# Patient Record
Sex: MALE | Race: WHITE
[De-identification: names, ages, dates, MRNs, and addresses within clinical notes are randomized per-mention and may not be internally consistent; named-entity substitution may affect disease eponyms.]

---

## 2018-04-23 ENCOUNTER — HOSPITAL ENCOUNTER (EMERGENCY)
Dept: HOSPITAL 62 - ER | Age: 38
Discharge: HOME | End: 2018-04-23
Payer: COMMERCIAL

## 2018-04-23 VITALS — DIASTOLIC BLOOD PRESSURE: 98 MMHG | SYSTOLIC BLOOD PRESSURE: 180 MMHG

## 2018-04-23 DIAGNOSIS — I11.0: Primary | ICD-10-CM

## 2018-04-23 DIAGNOSIS — R05: ICD-10-CM

## 2018-04-23 DIAGNOSIS — R06.02: ICD-10-CM

## 2018-04-23 DIAGNOSIS — I50.9: ICD-10-CM

## 2018-04-23 LAB
ADD MANUAL DIFF: NO
ANION GAP SERPL CALC-SCNC: 13 MMOL/L (ref 5–19)
BASOPHILS # BLD AUTO: 0 10^3/UL (ref 0–0.2)
BASOPHILS NFR BLD AUTO: 0.8 % (ref 0–2)
BUN SERPL-MCNC: 19 MG/DL (ref 7–20)
CALCIUM: 9.8 MG/DL (ref 8.4–10.2)
CHLORIDE SERPL-SCNC: 101 MMOL/L (ref 98–107)
CO2 SERPL-SCNC: 30 MMOL/L (ref 22–30)
EOSINOPHIL # BLD AUTO: 0.5 10^3/UL (ref 0–0.6)
EOSINOPHIL NFR BLD AUTO: 7.9 % (ref 0–6)
ERYTHROCYTE [DISTWIDTH] IN BLOOD BY AUTOMATED COUNT: 12.7 % (ref 11.5–14)
GLUCOSE SERPL-MCNC: 95 MG/DL (ref 75–110)
HCT VFR BLD CALC: 39.6 % (ref 37.9–51)
HGB BLD-MCNC: 13.6 G/DL (ref 13.5–17)
LYMPHOCYTES # BLD AUTO: 1.9 10^3/UL (ref 0.5–4.7)
LYMPHOCYTES NFR BLD AUTO: 32.8 % (ref 13–45)
MCH RBC QN AUTO: 28.9 PG (ref 27–33.4)
MCHC RBC AUTO-ENTMCNC: 34.3 G/DL (ref 32–36)
MCV RBC AUTO: 84 FL (ref 80–97)
MONOCYTES # BLD AUTO: 0.6 10^3/UL (ref 0.1–1.4)
MONOCYTES NFR BLD AUTO: 10.3 % (ref 3–13)
NEUTROPHILS # BLD AUTO: 2.8 10^3/UL (ref 1.7–8.2)
NEUTS SEG NFR BLD AUTO: 48.2 % (ref 42–78)
PLATELET # BLD: 265 10^3/UL (ref 150–450)
POTASSIUM SERPL-SCNC: 3.2 MMOL/L (ref 3.6–5)
RBC # BLD AUTO: 4.71 10^6/UL (ref 4.35–5.55)
SODIUM SERPL-SCNC: 144.1 MMOL/L (ref 137–145)
TOTAL CELLS COUNTED % (AUTO): 100 %
WBC # BLD AUTO: 5.8 10^3/UL (ref 4–10.5)

## 2018-04-23 PROCEDURE — 36415 COLL VENOUS BLD VENIPUNCTURE: CPT

## 2018-04-23 PROCEDURE — 80048 BASIC METABOLIC PNL TOTAL CA: CPT

## 2018-04-23 PROCEDURE — 99283 EMERGENCY DEPT VISIT LOW MDM: CPT

## 2018-04-23 PROCEDURE — 85025 COMPLETE CBC W/AUTO DIFF WBC: CPT

## 2018-04-23 PROCEDURE — 71046 X-RAY EXAM CHEST 2 VIEWS: CPT

## 2018-04-23 PROCEDURE — 83880 ASSAY OF NATRIURETIC PEPTIDE: CPT

## 2018-04-23 NOTE — RADIOLOGY REPORT (SQ)
EXAM DESCRIPTION:  CHEST 2 VIEWS



COMPLETED DATE/TIME:  4/23/2018 5:38 pm



REASON FOR STUDY:  sob/cough



COMPARISON:  None.



EXAM PARAMETERS:  NUMBER OF VIEWS: two views

TECHNIQUE: Digital Frontal and Lateral radiographic views of the chest acquired.

RADIATION DOSE: NA

LIMITATIONS: none



FINDINGS:  LUNGS AND PLEURA: No opacities, masses or pneumothorax. No pleural effusion.

MEDIASTINUM AND HILAR STRUCTURES: No masses or contour abnormalities.

HEART AND VASCULAR STRUCTURES: Heart normal size.  No evidence for failure.

BONES: No acute findings.

HARDWARE: None in the chest.

OTHER: No other significant finding.



IMPRESSION:  NO ACUTE RADIOGRAPHIC FINDING IN THE CHEST.



TECHNICAL DOCUMENTATION:  JOB ID:  [id number]

 2011 [employer]- All Rights Reserved



Reading location - IP/workstation name: [email or web address]

## 2018-04-23 NOTE — ER DOCUMENT REPORT
ED Blood Pressure Problem





- General


Chief Complaint: High Blood Pressure


Stated Complaint: BLOOD PRESSURE ISSUE


Time Seen by Provider: 04/23/18 17:32


Mode of Arrival: Ambulatory


Information source: Patient


Notes: 





Patient was referred from his family's doctor's office due to an elevated blood 

pressure of 190/120.  He received 0.2 of clonidine however blood pressure was 

not significantly decreased the patient was referred here to the emergency 

department.  Patient denies any headache.  He states over the last couple of 

days she has had some cough and congestion and shortness of breath.  He states 

this was the main reason he went to his family physician.  Symptoms have been 

intermittent.  Nothing makes them better or worse.  There is no known radiation 

symptoms.  They have been mild to moderate.


TRAVEL OUTSIDE OF THE [place] IN LAST 30 DAYS: No





- Related Data


Allergies/Adverse Reactions: 


 





No Known Allergies Allergy (Unverified 04/23/18 16:12)


 











Past Medical History





- General


Information source: Patient





- Social History


Smoking Status: Never Smoker


Frequency of alcohol use: None


Drug Abuse: None


Family History: Reviewed & Not Pertinent


Patient has suicidal ideation: No


Patient has homicidal ideation: No





- Past Medical History


Cardiac Medical History: Reports: Hx Hypertension


Renal/ Medical History: Denies: Hx Peritoneal Dialysis





Review of Systems





- Review of Systems


Constitutional: denies: Chills, Fever


Cardiovascular: denies: Chest pain, Palpitations


Respiratory: Cough, Short of breath


-: Yes All other systems reviewed and negative





Physical Exam





- Vital signs


Vitals: 





 











Temp Pulse Resp BP Pulse Ox


 


 97.9 F   73   16   170/98 H  96 


 


 04/23/18 16:36  04/23/18 16:36  04/23/18 16:36  04/23/18 16:36  04/23/18 16:36











Interpretation: Normal, Hypertensive





- General


General appearance: Appears well, Alert





- HEENT


Head: Normocephalic, Atraumatic


Eyes: Normal


Pupils: PERRL





- Respiratory


Respiratory status: No respiratory distress


Chest status: Nontender


Breath sounds: Normal


Chest palpation: Normal





- Cardiovascular


Rhythm: Regular


Heart sounds: Normal auscultation


Murmur: No





- Abdominal


Inspection: Normal


Distension: No distension


Bowel sounds: Normal


Tenderness: Nontender


Organomegaly: No organomegaly





- Back


Back: Normal, Nontender





- Extremities


General upper extremity: Normal inspection, Nontender, Normal color, Normal ROM

, Normal temperature


General lower extremity: Normal inspection, Nontender, Edema, Normal color, 

Normal ROM, Normal temperature, Normal weight bearing.  No: Marybel's sign





- Neurological


Neuro grossly intact: Yes


Cognition: Normal


Orientation: AAOx4


[place] Coma Scale Eye Opening: Spontaneous


[place] Coma Scale Verbal: Oriented


Richard Coma Scale Motor: Obeys Commands


[place] Coma Scale Total: 15


Speech: Normal


Motor strength normal: LUE, RUE, LLE, RLE


Sensory: Normal





- Psychological


Associated symptoms: Normal affect, Normal mood





- Skin


Skin Temperature: Warm


Skin Moisture: Dry


Skin Color: Normal





Course





- Re-evaluation


Re-evalutation: 





04/23/18 19:44


Some of patient symptoms would be consistent with early CHF.  These include 

shortness of breath, congestion, elevated blood pressure and an elevated BNP.  

I am going to start the patient on a low-dose Lasix and have him follow-up with 

his family physician.





- Vital Signs


Vital signs: 





 











Temp Pulse Resp BP Pulse Ox


 


 97.9 F   73   16   170/98 H  96 


 


 04/23/18 16:36  04/23/18 16:36  04/23/18 16:36  04/23/18 16:36  04/23/18 16:36














- Laboratory


Result Diagrams: 


 04/23/18 18:35





 04/23/18 18:35


Laboratory results interpreted by me: 





 











  04/23/18 04/23/18 04/23/18





  18:35 18:35 18:35


 


Eosinophils %  7.9 H  


 


Potassium   3.2 L 


 


Creatinine   1.28 H 


 


NT-Pro-B Natriuret Pep    913 H














- Diagnostic Test


Radiology reviewed: Image reviewed, Reports reviewed - I reviewed both images 

of the two-view chest x-ray.  No evidence of acute pathology.





Discharge





- Discharge


Clinical Impression: 


CHF (congestive heart failure)


Qualifiers:


 Heart failure type: unspecified Heart failure chronicity: acute Qualified Code(

s): I50.9 - Heart failure, unspecified





Condition: Stable


Disposition: HOME, SELF-CARE


Instructions:  High Blood Pressure, Requiring Treatment ([facility]), Congestive Heart 

Failure ([facility]), Lasix


Additional Instructions: 


Please call your primary care physician first thing in the morning.  Please 

tell him that your BNP was elevated to 900.  Please take your labs with you to 

see her family physician.  Please discuss possible cardiology referral.


Prescriptions: 


Furosemide [Lasix 20 mg Tablet] 20 mg PO QAM #30 tablet


Forms:  Return to Work

## 2020-11-10 ENCOUNTER — HOSPITAL ENCOUNTER (EMERGENCY)
Dept: HOSPITAL 62 - ER | Age: 40
Discharge: HOME | End: 2020-11-10
Payer: COMMERCIAL

## 2020-11-10 VITALS — SYSTOLIC BLOOD PRESSURE: 216 MMHG | DIASTOLIC BLOOD PRESSURE: 105 MMHG

## 2020-11-10 DIAGNOSIS — R04.0: Primary | ICD-10-CM

## 2020-11-10 DIAGNOSIS — I10: ICD-10-CM

## 2020-11-10 PROCEDURE — 99283 EMERGENCY DEPT VISIT LOW MDM: CPT

## 2020-11-10 PROCEDURE — 30901 CONTROL OF NOSEBLEED: CPT

## 2020-11-10 NOTE — ER DOCUMENT REPORT
ED Medical Screen (RME)





- General


Stated Complaint: NOSE BLEED


Time Seen by Provider: 11/10/20 16:42


Mode of Arrival: Ambulatory


Information source: Patient


Notes: 





38-year-old male patient presents emergency department chief complaint of 

nosebleed.  Patient reports he has been under a lot of stress over the last 

week.  He had a nosebleed last week.  Today he had another nosebleed while he 

was at work.  He states he went to the urgent care where they found him to have 

significantly elevated blood pressure.  He does have a history of hypertension, 

he is compliant with his hypertension medications.  He denies any chest pain or 

shortness of breath.  He does report a mild headache for the last few days.  

Again he reports significant stressors at home and work.





Patient has some packing in place that was placed by urgent care, he states that

did not put any Afrin in his nose as the urgent care did not have any.  There is

a lot of dried blood around his nose, dressing and short.





I have greeted and performed a rapid initial assessment of this patient.  A 

comprehensive ED assessment and evaluation of the patient, analysis of test 

results and completion of the medical decision making process will be conducted 

by additional ED providers.  I have specifically instructed the patient or 

family members with the patient to immediately return to any nursing staff 

should anything change in the patient's condition or with their chief complaint.





TRAVEL OUTSIDE OF THE [place] IN LAST 30 DAYS: No





- Related Data


Allergies/Adverse Reactions: 


                                        





No Known Allergies Allergy (Unverified 04/23/18 16:12)


   











Past Medical History





- Past Medical History


Cardiac Medical History: Reports: Hx Hypertension


Renal/ Medical History: Denies: Hx Peritoneal Dialysis





Physical Exam





- Vital signs


Vitals: 





                                        











Temp Pulse Resp BP Pulse Ox


 


 98.1 F   80   18   210/128 H  99 


 


 11/10/20 16:31  11/10/20 16:31  11/10/20 16:31  11/10/20 16:31  11/10/20 16:31














Course





- Vital Signs


Vital signs: 





                                        











Temp Pulse Resp BP Pulse Ox


 


 98.1 F   80   18   210/128 H  99 


 


 11/10/20 16:31  11/10/20 16:31  11/10/20 16:31  11/10/20 16:31  11/10/20 16:31

## 2020-11-10 NOTE — ER DOCUMENT REPORT
ED ENT





- General


Chief Complaint: Nose Bleed


Stated Complaint: NOSE BLEED


Time Seen by Provider: 11/10/20 16:42


Mode of Arrival: Ambulatory


Information source: Patient, Relative


Notes: 





Patient is a 40-year-old male comes emergency room with a complaint of 

epistasis.  Patient states that it started right after school.  Patient is a 

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  Patient does state 

that he is under lots of stress recently had a little bit of a nosebleed 2 days 

ago but it went away on its own.  Patient also noted to have a history of 

hypertension.  Has been noted to have difficulty controlling it.  Patient also 

has not taken his blood pressure medication today.  Patient denies any known 

traumatic events to the right nare.  He currently sees a cardiologist for his 

blood pressure and is due to follow-up with him soon.  And he also has a primary

care provider that he sees.  He currently takes Benicar, labetalol and Lasix.  

Patient does not smoke drink or do drugs.


TRAVEL OUTSIDE OF THE [place] IN LAST 30 DAYS: No





- HPI


Patient complains to provider of: Nose problem


Onset: Just prior to arrival


Onset/Duration: Sudden


Severity: Mild


Pain Level: 2


Context: denies: Injury, Travel


Location of pain: Nose


Associated symptoms: Nose bleed


Similar symptoms previously: No


Recently seen / treated by doctor: No





- Related Data


Allergies/Adverse Reactions: 


                                        





No Known Allergies Allergy (Verified 11/10/20 17:02)


   











Past Medical History





- General


Information source: Patient





- Social History


Smoking Status: Never Smoker


Cigarette use (# per day): No


Chew tobacco use (# tins/day): No


Smoking Education Provided: No


Frequency of alcohol use: None


Drug Abuse: None


Lives with: Family


Family History: Reviewed & Not Pertinent


Patient has homicidal ideation: No





- Past Medical History


Cardiac Medical History: Reports: Hx Hypertension


Renal/ Medical History: Denies: Hx Peritoneal Dialysis





Review of Systems





- Review of Systems


Constitutional: No symptoms reported


EENT: See HPI, Other - Epistaxis


Cardiovascular: No symptoms reported


Respiratory: No symptoms reported


Gastrointestinal: No symptoms reported


Genitourinary: No symptoms reported


Male Genitourinary: No symptoms reported


Musculoskeletal: No symptoms reported


Skin: No symptoms reported


Hematologic/Lymphatic: No symptoms reported


Neurological/Psychological: No symptoms reported


-: Yes All other systems reviewed and negative





Physical Exam





- Vital signs


Vitals: 


                                        











Temp Pulse Resp BP Pulse Ox


 


 98.1 F   80   18   210/128 H  99 


 


 11/10/20 16:31  11/10/20 16:31  11/10/20 16:31  11/10/20 16:31  11/10/20 16:31











Interpretation: Hypertensive





- Notes


Notes: 





PHYSICAL EXAMINATION:





GENERAL: Well-appearing, well-nourished and in no acute distress.





HEAD: Atraumatic, normocephalic.





EYES: Pupils equal round and reactive to light, extraocular movements intact, 

sclera anicteric, conjunctiva are normal.





ENT: Examination patient's her concern is his nose.  Examination of the nose 

shows the right nare to have slow bleeding currently.  Examination with the 

autoscope shows no anterior bleeding currently just some moist blood.  No 

apparent active bleed at this time.  But unable to see into the turbinates.  The

left nare has dried crusty blood.  No active bleeding.





NECK: Normal range of motion, supple without lymphadenopathy





LUNGS: Breath sounds clear to auscultation bilaterally and equal.  No wheezes 

rales or rhonchi.





HEART: Regular rate and rhythm without murmurs





NEUROLOGICAL:  Normal speech, normal gait.  Normal sensory, motor exams 





PSYCH: Normal mood, normal affect.





SKIN: Warm, Dry, normal turgor, no rashes or lesions noted.





Course





- Re-evaluation


Re-evalutation: 





11/10/20 20:11


Procedure note.  Patient came in with a slight amount of bleeding on the right 

nare.  Rather it then using a [employer] at this time I decided to use Afrin 

nasal spray saturated on cotton balls.  I saturated brought for the cotton balls

and placed in the right nare.  I waited approximately an hour and removed it and

so far bleeding has subsided.  There was no sign of any type of a anterior bleed

noted on initial examination.  Patient tolerated the packing well and also the 

removal well.


Patient's blood pressure remains somewhat elevated still after receiving 

clonidine.  Patient admitted he has not taken blood pressure medications today. 

When approached about further intervention patient refused at this time because 

he is not taking his blood pressure medications and he has close follow-up with 

his primary care provider as well as his cardiologist.


11/11/20 03:07








- Vital Signs


Vital signs: 


                                        











Temp Pulse Resp BP Pulse Ox


 


 98.1 F   74   20   216/105 H  95 


 


 11/10/20 19:27  11/10/20 19:27  11/10/20 19:27  11/10/20 19:27  11/10/20 19:30











11/10/20 20:12


Also to note patient has had a difficult time with his blood pressure here in 

the emergency room as well.  Last blood pressure reading was 200/104.  Although 

patient has not taken his blood pressure medication this evening.  Also to note 

that patient states that his normal blood pressure runs about 180/99.  He also 

states that he is under severe stress right now with home problems as well as at

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  I have gone in and have a discussion 

with patient after attempting to clonidine without any relief of his symptoms or

very minimal relief and offered to go ahead and place an IV in the patient and 

decrease his blood pressure through IV.  Patient refused this method at this 

time.  He states he knows this because he has not taken his blood pressure 

medication.  He will go home take his blood pressure medication the labetalol 

and will monitor his blood pressure with his home machine.  Patient understands 

that he is at high risk for stroke with these type of numbers.  Patient is an 

educated educator.


11/10/20 20:14


Also to note that wife is at bedside and she agrees with this plan of care and 

feels he will do much better at home than placing him on IVs here in the 

hospital.





Discharge





- Discharge


Clinical Impression: 


 Epistaxis not due to trauma





Hypertension


Qualifiers:


 Hypertension type: unspecified Qualified Code(s): I10 - Essential (primary) 

hypertension





Condition: Stable


Disposition: HOME, SELF-CARE


Instructions:  High Blood Pressure ([facility]), Nosebleed Instructions ([facility])


Additional Instructions: 


First thing you home take your blood pressure medication.  Monitor your blood 

pressure at home and keep a log of it.  Take it first thing in the morning when 

you wake up late morning late afternoon and before bed.  Also keep track your 

heart rate.  If your blood pressure is staying above 180/80 you need to contact 

your primary care provider first thing in the morning.  Should you have any 

concerns or problems or any elevation in your blood pressure after taking your 

blood pressure medication return to ER for reevaluation.  As far as nosebleed 

goes the Afrin nasal spray can be used to stop the bleeding as have instructed 

you on saturating the cotton balls and placing them in the nose for 

approximately 30 minutes.  If continues to have nosebleeds you will need to see 

a ears nose and throat physician and also your primary care provider.  The blood

pressure may also be up slightly not to because of the heavy dose of Afrin use.


Forms:  Elevated Blood Pressure, Return to Work